# Patient Record
Sex: FEMALE | Race: WHITE | Employment: UNEMPLOYED | URBAN - METROPOLITAN AREA
[De-identification: names, ages, dates, MRNs, and addresses within clinical notes are randomized per-mention and may not be internally consistent; named-entity substitution may affect disease eponyms.]

---
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
## 2021-04-06 ENCOUNTER — OFFICE VISIT (OUTPATIENT)
Dept: FAMILY MEDICINE | Facility: CLINIC | Age: 21
End: 2021-04-06
Payer: COMMERCIAL

## 2021-04-06 VITALS
SYSTOLIC BLOOD PRESSURE: 116 MMHG | RESPIRATION RATE: 16 BRPM | WEIGHT: 108.3 LBS | HEIGHT: 64 IN | HEART RATE: 75 BPM | OXYGEN SATURATION: 100 % | TEMPERATURE: 98.1 F | BODY MASS INDEX: 18.49 KG/M2 | DIASTOLIC BLOOD PRESSURE: 72 MMHG

## 2021-04-06 DIAGNOSIS — N92.6 MISSED PERIOD: ICD-10-CM

## 2021-04-06 DIAGNOSIS — Z11.3 ROUTINE SCREENING FOR STI (SEXUALLY TRANSMITTED INFECTION): Primary | ICD-10-CM

## 2021-04-06 DIAGNOSIS — N39.44 BED WETTING: ICD-10-CM

## 2021-04-06 LAB
ALBUMIN UR-MCNC: NEGATIVE MG/DL
AMORPH CRY #/AREA URNS HPF: ABNORMAL /HPF
APPEARANCE UR: ABNORMAL
BACTERIA #/AREA URNS HPF: ABNORMAL /HPF
BILIRUB UR QL STRIP: NEGATIVE
COLOR UR AUTO: YELLOW
GLUCOSE UR STRIP-MCNC: NEGATIVE MG/DL
HCG UR QL: NEGATIVE
HGB UR QL STRIP: NEGATIVE
KETONES UR STRIP-MCNC: NEGATIVE MG/DL
LEUKOCYTE ESTERASE UR QL STRIP: ABNORMAL
NITRATE UR QL: NEGATIVE
NON-SQ EPI CELLS #/AREA URNS LPF: ABNORMAL /LPF
PH UR STRIP: 8 PH (ref 5–7)
RBC #/AREA URNS AUTO: ABNORMAL /HPF
SOURCE: ABNORMAL
SP GR UR STRIP: 1.02 (ref 1–1.03)
SPECIMEN SOURCE: NORMAL
UROBILINOGEN UR STRIP-ACNC: 0.2 EU/DL (ref 0.2–1)
WBC #/AREA URNS AUTO: ABNORMAL /HPF
WET PREP SPEC: NORMAL

## 2021-04-06 PROCEDURE — 87210 SMEAR WET MOUNT SALINE/INK: CPT | Performed by: PHYSICIAN ASSISTANT

## 2021-04-06 PROCEDURE — 87591 N.GONORRHOEAE DNA AMP PROB: CPT | Performed by: PHYSICIAN ASSISTANT

## 2021-04-06 PROCEDURE — 81025 URINE PREGNANCY TEST: CPT | Performed by: PHYSICIAN ASSISTANT

## 2021-04-06 PROCEDURE — 99213 OFFICE O/P EST LOW 20 MIN: CPT | Performed by: PHYSICIAN ASSISTANT

## 2021-04-06 PROCEDURE — 81001 URINALYSIS AUTO W/SCOPE: CPT | Performed by: PHYSICIAN ASSISTANT

## 2021-04-06 PROCEDURE — 87491 CHLMYD TRACH DNA AMP PROBE: CPT | Performed by: PHYSICIAN ASSISTANT

## 2021-04-06 ASSESSMENT — MIFFLIN-ST. JEOR: SCORE: 1251.25

## 2021-04-07 DIAGNOSIS — A74.9 CHLAMYDIA: Primary | ICD-10-CM

## 2021-04-07 LAB
C TRACH DNA SPEC QL NAA+PROBE: POSITIVE
N GONORRHOEA DNA SPEC QL NAA+PROBE: NEGATIVE
SPECIMEN SOURCE: ABNORMAL
SPECIMEN SOURCE: NORMAL

## 2021-04-07 RX ORDER — AZITHROMYCIN 500 MG/1
1000 TABLET, FILM COATED ORAL ONCE
Qty: 2 TABLET | Refills: 0 | Status: SHIPPED | OUTPATIENT
Start: 2021-04-07 | End: 2021-04-07

## 2021-04-07 NOTE — RESULT ENCOUNTER NOTE
Patient informed of provider message. All questions answered. [facility] disease report faxed.    Barbara Yoon RN

## 2021-07-23 DIAGNOSIS — Z30.011 ENCOUNTER FOR BCP (BIRTH CONTROL PILLS) INITIAL PRESCRIPTION: ICD-10-CM

## 2021-07-26 NOTE — TELEPHONE ENCOUNTER
Routing to TC team to assist in scheduling.     LMTCB- needs to be scheduled for PHYSICAL     4/6/21- STI (MK)  4/7/20 VIRTUAL - irregular bleeding (FLIP)  3/17/20 VIRTUAL- acute ill  6/13/2019- IN PERSON- Birth Control (DM)    Tanika BARRETT RN

## 2021-08-02 RX ORDER — NORETHINDRONE ACETATE AND ETHINYL ESTRADIOL .02; 1 MG/1; MG/1
TABLET ORAL
Qty: 63 TABLET | Refills: 0 | Status: SHIPPED | OUTPATIENT
Start: 2021-08-02 | End: 2021-08-10

## 2021-08-02 NOTE — TELEPHONE ENCOUNTER
Patient has an upcoming appointment. Will give refill so she does not have to go without.    Estela Elias RN

## 2021-08-10 ENCOUNTER — OFFICE VISIT (OUTPATIENT)
Dept: FAMILY MEDICINE | Facility: CLINIC | Age: 21
End: 2021-08-10
Payer: COMMERCIAL

## 2021-08-10 VITALS
WEIGHT: 106.4 LBS | RESPIRATION RATE: 16 BRPM | HEIGHT: 64 IN | DIASTOLIC BLOOD PRESSURE: 60 MMHG | SYSTOLIC BLOOD PRESSURE: 108 MMHG | BODY MASS INDEX: 18.16 KG/M2 | OXYGEN SATURATION: 98 % | HEART RATE: 90 BPM | TEMPERATURE: 98.8 F

## 2021-08-10 DIAGNOSIS — A74.9 POSITIVE CHLAMYIDA TEST: ICD-10-CM

## 2021-08-10 DIAGNOSIS — Z86.19 HISTORY OF CHLAMYDIA INFECTION: ICD-10-CM

## 2021-08-10 DIAGNOSIS — Z30.011 ENCOUNTER FOR BCP (BIRTH CONTROL PILLS) INITIAL PRESCRIPTION: ICD-10-CM

## 2021-08-10 DIAGNOSIS — Z00.00 ROUTINE GENERAL MEDICAL EXAMINATION AT A HEALTH CARE FACILITY: Primary | ICD-10-CM

## 2021-08-10 PROCEDURE — 99395 PREV VISIT EST AGE 18-39: CPT | Performed by: NURSE PRACTITIONER

## 2021-08-10 PROCEDURE — 87491 CHLMYD TRACH DNA AMP PROBE: CPT | Performed by: NURSE PRACTITIONER

## 2021-08-10 PROCEDURE — 87591 N.GONORRHOEAE DNA AMP PROB: CPT | Performed by: NURSE PRACTITIONER

## 2021-08-10 RX ORDER — NORETHINDRONE ACETATE AND ETHINYL ESTRADIOL .02; 1 MG/1; MG/1
1 TABLET ORAL DAILY
Qty: 84 TABLET | Refills: 3 | Status: SHIPPED | OUTPATIENT
Start: 2021-08-10 | End: 2022-05-31

## 2021-08-10 ASSESSMENT — ENCOUNTER SYMPTOMS
HEMATURIA: 0
JOINT SWELLING: 0
ARTHRALGIAS: 0
HEMATOCHEZIA: 0
SHORTNESS OF BREATH: 0
PALPITATIONS: 0
PARESTHESIAS: 0
NAUSEA: 0
WEAKNESS: 0
SORE THROAT: 0
EYE PAIN: 0
COUGH: 0
NERVOUS/ANXIOUS: 0
MYALGIAS: 0
HEARTBURN: 0
FREQUENCY: 0
FEVER: 0
CHILLS: 0
DIARRHEA: 0
CONSTIPATION: 0
DIZZINESS: 0
HEADACHES: 0
ABDOMINAL PAIN: 0
DYSURIA: 0

## 2021-08-10 ASSESSMENT — MIFFLIN-ST. JEOR: SCORE: 1242.63

## 2021-08-10 NOTE — PROGRESS NOTES
SUBJECTIVE:   CC: Tomeka RENDON Head is an 20 year old woman who presents for preventive health visit.       Patient has been advised of split billing requirements and indicates understanding: Yes  Healthy Habits:     Getting at least 3 servings of Calcium per day:  Yes    Bi-annual eye exam:  NO    Dental care twice a year:  NO    Sleep apnea or symptoms of sleep apnea:  None    Diet:  Regular (no restrictions)    Frequency of exercise:  2-3 days/week    Duration of exercise:  15-30 minutes    Taking medications regularly:  Yes    Medication side effects:  None    PHQ-2 Total Score: 0    Additional concerns today:  No    History of chlamydia 4/2021, needs follow-up test of cure screening.   No new partners or concerns for symptoms.     Today's PHQ-2 Score:   PHQ-2 ( 1999 Pfizer) 8/10/2021   Q1: Little interest or pleasure in doing things 0   Q2: Feeling down, depressed or hopeless 0   PHQ-2 Score 0   Q1: Little interest or pleasure in doing things Not at all   Q2: Feeling down, depressed or hopeless Not at all   PHQ-2 Score 0       Abuse: Current or Past (Physical, Sexual or Emotional) - No  Do you feel safe in your environment? Yes    Have you ever done Advance Care Planning? (For example, a Health Directive, POLST, or a discussion with a medical provider or your loved ones about your wishes): No, advance care planning information given to patient to review.  Patient declined advance care planning discussion at this time.    Social History     Tobacco Use     Smoking status: Never Smoker     Smokeless tobacco: Never Used     Tobacco comment: Non smoking home   Substance Use Topics     Alcohol use: No     Alcohol/week: 0.0 standard drinks     If you drink alcohol do you typically have >3 drinks per day or >7 drinks per week? No    Alcohol Use 8/10/2021   Prescreen: >3 drinks/day or >7 drinks/week? No   Prescreen: >3 drinks/day or >7 drinks/week? -   No flowsheet data found.    Reviewed orders with patient.  Reviewed  "health maintenance and updated orders accordingly - Yes  Lab work is in process  Labs reviewed in EPIC    Breast Cancer Screening:    History of abnormal Pap smear: NO - under age 21, PAP not appropriate for age     Reviewed and updated as needed this visit by clinical staff  Tobacco  Allergies  Meds  Problems  Med Hx  Surg Hx  Fam Hx  Soc Hx          Reviewed and updated as needed this visit by Provider  Tobacco  Allergies  Meds  Problems  Med Hx  Surg Hx  Fam Hx         Past Medical History:   Diagnosis Date     NO ACTIVE PROBLEMS       Past Surgical History:   Procedure Laterality Date     NO HISTORY OF SURGERY         Review of Systems   Constitutional: Negative for chills and fever.   HENT: Negative for congestion, ear pain, hearing loss and sore throat.    Eyes: Negative for pain and visual disturbance.   Respiratory: Negative for cough and shortness of breath.    Cardiovascular: Negative for chest pain, palpitations and peripheral edema.   Gastrointestinal: Negative for abdominal pain, constipation, diarrhea, heartburn, hematochezia and nausea.   Genitourinary: Negative for dysuria, frequency, genital sores, hematuria and urgency.   Musculoskeletal: Negative for arthralgias, joint swelling and myalgias.   Skin: Negative for rash.   Neurological: Negative for dizziness, weakness, headaches and paresthesias.   Psychiatric/Behavioral: Negative for mood changes. The patient is not nervous/anxious.           OBJECTIVE:   /60 (BP Location: Right arm, Patient Position: Chair, Cuff Size: Adult Regular)   Pulse 90   Temp 98.8  F (37.1  C) (Oral)   Resp 16   Ht 1.626 m (5' 4\")   Wt 48.3 kg (106 lb 6.4 oz)   SpO2 98%   BMI 18.26 kg/m    Physical Exam  GENERAL: healthy, alert and no distress  EYES: Eyes grossly normal to inspection, PERRL and conjunctivae and sclerae normal  HENT: ear canals and TM's normal, nose and mouth without ulcers or lesions  NECK: no adenopathy, no asymmetry, masses, or " "scars and thyroid normal to palpation  RESP: lungs clear to auscultation - no rales, rhonchi or wheezes  CV: regular rate and rhythm, normal S1 S2, no S3 or S4, no murmur, click or rub, no peripheral edema and peripheral pulses strong  ABDOMEN: soft, nontender, no hepatosplenomegaly, no masses and bowel sounds normal  MS: no gross musculoskeletal defects noted, no edema  SKIN: no suspicious lesions or rashes  NEURO: Normal strength and tone, mentation intact and speech normal  PSYCH: mentation appears normal, affect normal/bright    Diagnostic Test Results:  Labs reviewed in Epic    ASSESSMENT/PLAN:   Tomeka was seen today for physical.    Diagnoses and all orders for this visit:    Routine general medical examination at a health care facility  Well adult, annual follow-up.     Encounter for BCP (birth control pills) initial prescription  -     norethindrone-ethinyl estradiol (MICROGESTIN 1/20) 1-20 MG-MCG tablet; Take 1 tablet by mouth daily  Refilled.     History of chlamydia infection  -     Chlamydia & Gonorrhea by PCR, [facility] - Clinic Collect  Test of cure screen pending.     Other orders  -     REVIEW OF HEALTH MAINTENANCE PROTOCOL ORDERS        Patient has been advised of split billing requirements and indicates understanding: Yes  COUNSELING:  Reviewed preventive health counseling, as reflected in patient instructions    Estimated body mass index is 18.26 kg/m  as calculated from the following:    Height as of this encounter: 1.626 m (5' 4\").    Weight as of this encounter: 48.3 kg (106 lb 6.4 oz).        She reports that she has never smoked. She has never used smokeless tobacco.      Counseling Resources:  ATP IV Guidelines  Pooled Cohorts Equation Calculator  Breast Cancer Risk Calculator  BRCA-Related Cancer Risk Assessment: FHS-7 Tool  FRAX Risk Assessment  ICSI Preventive Guidelines  Dietary Guidelines for Americans, 2010  USDA's MyPlate  ASA Prophylaxis  Lung CA Screening    Tomeka Ahmadi, TAMMY " INGRID  [facility]

## 2021-08-11 ENCOUNTER — TELEPHONE (OUTPATIENT)
Dept: FAMILY MEDICINE | Facility: CLINIC | Age: 21
End: 2021-08-11

## 2021-08-11 DIAGNOSIS — A74.9 POSITIVE CHLAMYIDA TEST: ICD-10-CM

## 2021-08-11 LAB
C TRACH DNA SPEC QL PROBE+SIG AMP: POSITIVE
N GONORRHOEA DNA SPEC QL NAA+PROBE: NEGATIVE

## 2021-08-11 RX ORDER — DOXYCYCLINE 100 MG/1
100 CAPSULE ORAL 2 TIMES DAILY
Qty: 14 CAPSULE | Refills: 0 | Status: SHIPPED | OUTPATIENT
Start: 2021-08-11 | End: 2021-08-12

## 2021-08-11 NOTE — TELEPHONE ENCOUNTER
Message left for patient to return call to clinic triage     Positive chalmydia (patient notified via my chart by provider)  - [facility] reporting completed/faxed and placed in log book     rx for doxycycline sent to mail order by Tomeka Ahmadi CNP   - need to find out which local pharmacy patient would like to use then RN can call in or send to Benjie DÍAZ who is in clinic today until 6pm   Discuss safety precautions for STD's     Sharon Mays, Registered Nurse, PAL (Patient Advocate Liason)   [facility]   [phone]

## 2021-08-11 NOTE — TELEPHONE ENCOUNTER
2nd message left for patient to return call to clinic triage     Sharon Mays, Registered Nurse, PAL (Patient Advocate Liason)   [facility]

## 2021-08-12 RX ORDER — DOXYCYCLINE 100 MG/1
100 CAPSULE ORAL 2 TIMES DAILY
Qty: 14 CAPSULE | Refills: 0 | Status: SHIPPED | OUTPATIENT
Start: 2021-08-12 | End: 2022-05-31

## 2021-08-12 NOTE — TELEPHONE ENCOUNTER
Prescription sent to local pharmacy as requested.   Thank you  TAMMY Yang CNP on 8/12/2021 at 9:51 AM

## 2021-08-12 NOTE — TELEPHONE ENCOUNTER
Patient notified, MDVANESA notified     Sharon Mays, Registered Nurse  [facility]

## 2021-08-12 NOTE — TELEPHONE ENCOUNTER
Tomeka Ahmadi CNP   Please resend antibiotic to local pharmacy, was originally sent to mail order - RN pended     Patient notified of positive result - she was unable to get into my chart     Advised no intercourse until treatment completed   Advise partners of positive result   And use condoms when returns to sexual activity     Sharon Mays, Registered Nurse   [facility]

## 2021-09-18 ENCOUNTER — HEALTH MAINTENANCE LETTER (OUTPATIENT)
Age: 21
End: 2021-09-18

## 2022-02-07 ENCOUNTER — TELEPHONE (OUTPATIENT)
Dept: FAMILY MEDICINE | Facility: CLINIC | Age: 22
End: 2022-02-07
Payer: COMMERCIAL

## 2022-02-07 ENCOUNTER — VIRTUAL VISIT (OUTPATIENT)
Dept: FAMILY MEDICINE | Facility: CLINIC | Age: 22
End: 2022-02-07
Payer: COMMERCIAL

## 2022-02-07 DIAGNOSIS — Z72.51 UNPROTECTED SEXUAL INTERCOURSE: Primary | ICD-10-CM

## 2022-02-07 PROCEDURE — 99213 OFFICE O/P EST LOW 20 MIN: CPT | Mod: 95 | Performed by: PHYSICIAN ASSISTANT

## 2022-02-07 RX ORDER — LEVONORGESTREL 1.5 MG/1
1.5 TABLET ORAL ONCE
Qty: 1 TABLET | Refills: 0 | Status: SHIPPED | OUTPATIENT
Start: 2022-02-07 | End: 2022-05-31

## 2022-02-07 NOTE — TELEPHONE ENCOUNTER
Recommend virtual visit to discuss. I have a few openings later today. Please call patient to schedule.    Keena Davies PA-C

## 2022-02-07 NOTE — TELEPHONE ENCOUNTER
Patient calling stating she had unprotected intercourse Saturday 2/5/2022.  She had been on the Birth Control regularly up until recently.  LMP 1/22/2022.  She had been off the birth control for about 3 weeks prior to her period.  She took 1-2 pills before she got her period and then 4 pills after her period ended.  She was planning on taking Plan B but ended up taking an ovulation test yesterday and it came back Positive.      She states that she started bleeding later in the night after intercourse.  Then the next day (Sunday) she bled heavy with cramping and now today (Monday) she is not bleeding as much.    She is wondering what she should take or what can she take to PREVENT a pregnancy.  Please review and advise further.    Patient going back to school today-[place] .    [phone]    Estela Elias RN

## 2022-02-07 NOTE — PATIENT INSTRUCTIONS
Patient Education     Emergency Contraception (EC)  Emergency contraception (EC) is used to prevent a pregnancy after a woman has had unprotected sex. EC prevents pregnancy in different ways. It may:    Prevent the egg from leaving the ovary    Stop the sperm from reaching the egg    Keep the fertilized egg from attaching to the womb (uterus)  EC is sometimes called the  morning after pill.  But this name is misleading, because some types of EC can work up to 5 days after having sex, not just the morning after. EC is not an  pill. It does not work if you are already pregnant. If you are overweight or obese, certain types of EC may not work as well for you. Talk with your healthcare provider about your options.  When to use EC  You may want to use EC in any of the following types of cases:    You had sex without birth control (unprotected sex).    You were forced to have sex.    You were using a condom but it broke or came off.    You forgot to take your pill or missed your shot.    You suspect that your ring, patch, diaphragm, cervical cap, sponge, or spermicide was not used correctly.    You use the natural family planning method and had unprotected sex at a time when you are likely to become pregnant. (This is usually week 2 of a 4-week cycle, if your periods are regular.)    You were using the withdrawal method, and he didn t pull out in time.    Your IUD came out.    You think your regular birth control method failed.  Note: EC does not protect you from sexually transmitted diseases (STDs). To protect against STDs when having sex, you must always use a condom. This is true even when you use another method of birth control.  Types of EC  Oral medicine    Levonorgestrel (available over the counter)    Ulipristal acetate (prescription only)    A high dose of certain brands of birth control pills (BCPs) can also be used as EC. Talk with your provider to learn more about this option. BCPs require a  prescription.  Note: EC can cause side effects in some women. These can include nausea, vomiting, tender breasts, and headaches. If needed, you can buy medicine over the counter or prescribed to help prevent nausea and vomiting. Talk with a healthcare provider or a pharmacist to learn more.  Insertion of a copper intrauterine device (IUD)    When an IUD is used as a form of EC, it must be placed into the uterus by a trained healthcare provider within 5 days after having unprotected sex. The IUD can be removed after your next period. Or it can be left in place for ongoing birth control. Talk with your provider to learn more.    How well EC works  When used correctly, EC works very well to prevent pregnancy. It is most effective when taken as soon as possible after unprotected sex or suspected failure of birth control. Ideally, EC should be taken within 72 hours. But it can be used up to 120 hours after sex. Ask your provider or pharmacist for details on exactly how well different types of EC work.  Home care    Take EC exactly as directed.    If you don't get your period in 3 weeks, use a home pregnancy test or see a healthcare provider to find out if you are pregnant.    If you have sex before your next period starts, be sure to use another birth control method.    If you are not using birth control regularly, see a healthcare to learn more about your options. You may also go to a local family planning clinic.    Don't rely on EC as a form of regular birth control.    Follow-up care  Follow up with your healthcare provider, if needed.  When to seek medical advice  Call your healthcare provider right away if any of these occur:    You throw up (vomit) within 3 hours of taking EC.    You have severe side effects from taking EC.    You have fever of 100.4 F (38 C) or higher, or as directed by your healthcare provider.    You have irregular vaginal bleeding.    You have heavy vaginal bleeding. This means soaking 1 pad  an hour for 3 hours.    You feel weak, dizzy, or faint when standing.  Resources  To learn more about EC, visit:  Office on Women s Health, [place] Department of Health and Human Services, www.womenshealth.gov, 292.116.2957  [employer] last reviewed this educational content on 6/1/2018 2000-2021 [employer]. All rights reserved. This information is not intended as a substitute for professional medical care. Always follow your healthcare professional's instructions.

## 2022-02-07 NOTE — PROGRESS NOTES
Tomeka is a 21 year old who is being evaluated via a billable video visit.      How would you like to obtain your AVS? MyChart  If the video visit is dropped, the invitation should be resent by: Text to cell phone: [phone]  Will anyone else be joining your video visit? No    Video Start Time: 5:26 PM     Assessment & Plan       Unprotected sexual intercourse  Discussed options. She is ovulating but unknown if she has had her LH surge. Longer acting birth control might be a good option for her and we discussed that it can also be used as emergency contraception. She will take plan B and [facility]. She denies concern for STI and does not want testing at this time. Advised she use condoms every time.  - levonorgestrel (PLAN B) 1.5 MG tablet; Take 1 tablet (1.5 mg) by mouth once for 1 dose    Return in about 6 months (around 8/7/2022) for Preventive Physical Exam.    Keena Davies PA-C  [facility]    Teddy Eckert is a 21 year old who presents for the following health issues     HPI   Wanted to know if there are any options termination of pregnancy if needed.    Patient calling stating she had unprotected intercourse Saturday 2/5/2022.  She had been on the Birth Control regularly up until recently.  LMP 1/22/2022.  She had been off the birth control for about 3 weeks prior to her period.  She took 1-2 pills before she got her period and then 4 pills after her period ended.  She was planning on taking Plan B but ended up taking an ovulation test yesterday and it came back positive.      She states that she started bleeding later in the night after intercourse.  Then the next day (Sunday) she bled with mild cramping and now today (Monday) she is not bleeding as much. Mild cramping but improving.     She is wondering what she should take or what can she take to PREVENT a pregnancy.    She denies concern for  STI.      Review of Systems   Constitutional, HEENT, cardiovascular, pulmonary, gi and gu systems are negative, except as otherwise noted.      Objective    Vitals - Patient Reported  Pain Score: No Pain (0)      Vitals:  No vitals were obtained today due to virtual visit.    Physical Exam   GENERAL: Healthy, alert and no distress  EYES: Eyes grossly normal to inspection.  No discharge or erythema, or obvious scleral/conjunctival abnormalities.  RESP: No audible wheeze, cough, or visible cyanosis.  No visible retractions or increased work of breathing.    SKIN: Visible skin clear. No significant rash, abnormal pigmentation or lesions.  NEURO: Cranial nerves grossly intact.  Mentation and speech appropriate for age.  PSYCH: Mentation appears normal, affect normal/bright, judgement and insight intact, normal speech and appearance well-groomed.      Video-Visit Details    Type of service:  Video Visit    Video End Time: 5:36 PM    Originating Location (pt. Location): Home    [facility]     Platform used for Video Visit: [employer]

## 2022-05-24 ENCOUNTER — NURSE TRIAGE (OUTPATIENT)
Dept: NURSING | Facility: CLINIC | Age: 22
End: 2022-05-24
Payer: COMMERCIAL

## 2022-05-24 NOTE — TELEPHONE ENCOUNTER
Past few months having period 2 xd month.  Last 4 months has been happening.    Taking BCP.    Needs to be seen in next 2 weeks.Warm transferred to scheduling      Sulema Brandt RN  [facility] Nurse Advisor      Reason for Disposition    Menstrual cycle < 21 days OR > 35 days, and occurs more than two cycles (2 months) this past year    Additional Information    Negative: SEVERE vaginal bleeding (e.g., continuous red blood from vagina, or large blood clots) and very weak (can't stand)    Negative: Passed out (i.e., fainted, collapsed and was not responding)    Negative: Difficult to awaken or acting confused (e.g., disoriented, slurred speech)    Negative: Shock suspected (e.g., cold/pale/clammy skin, too weak to stand, low BP, rapid pulse)    Negative: Sounds like a life-threatening emergency to the triager    Negative: Pregnant > 20 weeks (5 months or more)    Negative: Pregnant < 20 weeks (less than 5 months)    Negative: Postpartum (from 0 to 6 weeks after delivery)    Negative: Vaginal discharge is the main symptom and bleeding is slight    Negative: SEVERE abdominal pain (e.g., excruciating)    Negative: SEVERE dizziness (e.g., unable to stand, requires support to walk, feels like passing out now)    Negative: SEVERE vaginal bleeding (e.g., soaking 2 pads or tampons per hour and present 2 or more hours; 1 menstrual cup every 2 hours)    Negative: MODERATE vaginal bleeding (e.g., soaking pad or tampon per hour and present > 6 hours; 1 menstrual cup every 6 hours)    Negative: Pale skin (pallor) of new onset or worsening    Negative: Constant abdominal pain lasting > 2 hours    Negative: Patient sounds very sick or weak to the triager    Negative: Taking Coumadin (warfarin) or other strong blood thinner, or known bleeding disorder (e.g., thrombocytopenia)    Negative: Skin bruises or nosebleed and not caused by an injury    Negative: Bleeding/spotting after procedure (e.g., biopsy) or pelvic  examination (e.g., pap smear) that persists > 3 days    Negative: Patient wants to be seen    Negative: Passed tissue (e.g., gray-white)    Negative: Periods last > 7 days    Negative: Periods with > 6 soaked pads or tampons per day    Negative: Uses menstrual cups and more than 80 ml blood per menstrual period    Negative: Missed period has occurred 2 or more times in the last year and the cause is not known    Protocols used: VAGINAL BLEEDING - [id number]

## 2022-05-31 ENCOUNTER — OFFICE VISIT (OUTPATIENT)
Dept: FAMILY MEDICINE | Facility: CLINIC | Age: 22
End: 2022-05-31
Payer: COMMERCIAL

## 2022-05-31 VITALS
HEIGHT: 64 IN | WEIGHT: 104 LBS | DIASTOLIC BLOOD PRESSURE: 66 MMHG | HEART RATE: 100 BPM | BODY MASS INDEX: 17.75 KG/M2 | OXYGEN SATURATION: 100 % | RESPIRATION RATE: 20 BRPM | SYSTOLIC BLOOD PRESSURE: 100 MMHG | TEMPERATURE: 97.2 F

## 2022-05-31 DIAGNOSIS — Z11.3 ROUTINE SCREENING FOR STI (SEXUALLY TRANSMITTED INFECTION): ICD-10-CM

## 2022-05-31 DIAGNOSIS — N89.8 VAGINAL ODOR: ICD-10-CM

## 2022-05-31 DIAGNOSIS — N92.6 IRREGULAR MENSES: Primary | ICD-10-CM

## 2022-05-31 DIAGNOSIS — B96.89 BACTERIAL VAGINOSIS: ICD-10-CM

## 2022-05-31 DIAGNOSIS — N76.0 BACTERIAL VAGINOSIS: ICD-10-CM

## 2022-05-31 LAB
CLUE CELLS: PRESENT
TRICHOMONAS, WET PREP: ABNORMAL
WBC'S/HIGH POWER FIELD, WET PREP: ABNORMAL
YEAST, WET PREP: ABNORMAL

## 2022-05-31 PROCEDURE — 87491 CHLMYD TRACH DNA AMP PROBE: CPT | Performed by: NURSE PRACTITIONER

## 2022-05-31 PROCEDURE — 99213 OFFICE O/P EST LOW 20 MIN: CPT | Performed by: NURSE PRACTITIONER

## 2022-05-31 PROCEDURE — 87591 N.GONORRHOEAE DNA AMP PROB: CPT | Performed by: NURSE PRACTITIONER

## 2022-05-31 PROCEDURE — 87210 SMEAR WET MOUNT SALINE/INK: CPT | Performed by: NURSE PRACTITIONER

## 2022-05-31 RX ORDER — NORGESTIMATE AND ETHINYL ESTRADIOL 0.25-0.035
1 KIT ORAL DAILY
Qty: 90 TABLET | Refills: 3 | Status: SHIPPED | OUTPATIENT
Start: 2022-05-31 | End: 2022-08-08

## 2022-05-31 RX ORDER — METRONIDAZOLE 7.5 MG/G
1 GEL VAGINAL DAILY
Qty: 25 G | Refills: 0 | Status: SHIPPED | OUTPATIENT
Start: 2022-05-31 | End: 2022-06-05

## 2022-05-31 NOTE — PROGRESS NOTES
Assessment & Plan     Tomeka was seen today for vaginal bleeding.    Diagnoses and all orders for this visit:    Irregular menses  2 cycles each month for the past 3 months.    Plan trial change in oral contraceptives.    -     norgestimate-ethinyl estradiol (ORTHO-CYCLEN) 0.25-35 MG-MCG tablet; Take 1 tablet by mouth daily    Routine screening for STI (sexually transmitted infection)  -     NEISSERIA GONORRHOEA PCR  -     CHLAMYDIA TRACHOMATIS PCR    Vaginal odor  -     Wet prep - lab collect  Results for orders placed or performed in visit on 05/31/22   Wet prep - lab collect     Status: Abnormal    Specimen: Vagina; Swab   Result Value Ref Range    Trichomonas Absent Absent    Yeast Absent Absent    Clue Cells Present (A) Absent    WBCs/high power field 3+ (A) None   Metronidazole gel x5 days.          Return in about 3 months (around 8/31/2022) for Med check, No improvement or sooner with worsening symptoms.    Meagan Castrejon, TAMMY CNP  [facility]        Tomeka is a 21 year old who presents for the following health issues:      History of Present Illness       Reason for visit:  In need of new birth control    She eats 2-3 servings of fruits and vegetables daily.She consumes 1 sweetened beverage(s) daily.She exercises with enough effort to increase her heart rate 30 to 60 minutes per day.  She exercises with enough effort to increase her heart rate 4 days per week. She is missing 1 dose(s) of medications per week.       Menstrual Concern      Since March has had 2 cycles each month.    Menstrual bleeding is lasting 5 days - needing to wear tampons.      Isn't taking on a consistent basis.  Feels irritable and hummel on the birth control.   Is not currently sexually active.    No chance of pregnancy.      Oral contraceptives over the past 5 years.      Onset/Duration: 4 months- starting in march having 2 periods a month  Description:   Duration of bleeding episodes: 5  "days  Frequency of periods: (1st day of one to 1st day of next):  every middle of the month and the end of month   Describe bleeding/flow:   Clots: no  Number of pads/day: not heavy-        Cramping: mild  Accompanying Signs & Symptoms:  Lightheadedness: no  Temperature intolerance: no  Nosebleeds/Easy bruising: no  Vaginal Discharge: no  Acne: no  Change in body hair: no  History:  No LMP recorded.  Previous normal periods: YES  Contraceptive use: birth control  Sexually active: YES  Any bleeding after intercourse: YES  Abnormal PAP Smears: no  Precipitating or alleviating factors: None  Therapies tried and outcome: None        Review of Systems   Constitutional, HEENT, cardiovascular, pulmonary, gi and gu systems are negative, except as otherwise noted.      Objective    /66   Pulse 100   Temp 97.2  F (36.2  C) (Tympanic)   Resp 20   Ht 1.626 m (5' 4\")   Wt 47.2 kg (104 lb)   SpO2 100%   BMI 17.85 kg/m    Body mass index is 17.85 kg/m .       Physical Exam   GENERAL: healthy, alert and no distress  RESP: lungs clear to auscultation - no rales, rhonchi or wheezes  CV: regular rate and rhythm, normal S1 S2  ABDOMEN: soft, nontender, bowel sounds normal  PSYCH: mentation appears normal, affect normal/bright          "

## 2022-06-01 LAB
C TRACH DNA SPEC QL NAA+PROBE: NEGATIVE
N GONORRHOEA DNA SPEC QL NAA+PROBE: NEGATIVE

## 2022-06-01 NOTE — RESULT ENCOUNTER NOTE
Dear Tomeka,     Gonorrhea and Chlamydia screening tests were negative.      Please send a Bill Me Later message or call [phone]  if you have any questions.      Meagan Castrejon, TAMMY, CNP  [facility]    If you have further questions about the interpretation of your labs, [email or web address] is a good website to check out for further information.

## 2022-08-01 DIAGNOSIS — N92.6 IRREGULAR MENSES: ICD-10-CM

## 2022-08-01 RX ORDER — DOXYCYCLINE 100 MG/1
CAPSULE ORAL
COMMUNITY
Start: 2022-07-12

## 2022-08-01 RX ORDER — TRETINOIN 0.5 MG/G
CREAM TOPICAL
COMMUNITY
Start: 2022-07-12

## 2022-08-01 RX ORDER — AZELAIC ACID 0.15 G/G
GEL TOPICAL
COMMUNITY
Start: 2022-07-12

## 2022-08-01 NOTE — TELEPHONE ENCOUNTER
I have not prescribed either of these medications for her. She should follow-up with the provider who prescribed the medications. It looks like the doxycycline was filled at a pharmacy in [place] - has she moved out of state?     There have been isolated reports of decreased efficacy of oral birth control pills leading to breakthrough bleeding or pregnancy in women taking doxycycline but this has not been shown in studies. Doxycycline has not been proven to affect the pharmacokinetics or efficacy of oral birth control pills.     Keena Davies PA-C

## 2022-08-01 NOTE — TELEPHONE ENCOUNTER
Pt calling stating that she is taking doxycycline hyclate, daily for 3 months for acne - she [facility] a [facility] that this medication interacts with birth control.     Pt stated that the reason why she is on the BC pills is for period regulation as she was getting them 2 times a month and since she has been on this medication she is still having her periods two times a month     Would like to know what she should be doing?       Best #   Telephone Information:   Mobile [phone] ok         Please review and advise     Thank you     Sulema Greenwood RN, BSN  [facility]

## 2022-08-05 NOTE — TELEPHONE ENCOUNTER
patient called back to follow up on refill request.   [facility] sent the patient an alert that she can not have her refills of birth control due to drug interaction with doxycycline     Patient had been taking the birth control along with the doxycyline up until yesterday when she received the alert.      writer explained provider's note below.   See office note from 5/31/22     Should she stop the birth control, it  has not regulated her period? Period  Is still 2 times a month.     Can her recommend follow up appointment be virutal? (8/31 or sooner for worsening symptoms)     soonest she can come in person is early September    Patient still going to school in [place], [place] School currently  lives in [place] with her parents.       Use Back up method of birth control for  now.     Linda NUNN RN   [facility] Triage

## 2022-08-08 RX ORDER — NORGESTIMATE AND ETHINYL ESTRADIOL 0.25-0.035
1 KIT ORAL DAILY
Qty: 90 TABLET | Refills: 3 | Status: SHIPPED | OUTPATIENT
Start: 2022-08-08

## 2022-09-19 ENCOUNTER — TELEPHONE (OUTPATIENT)
Dept: FAMILY MEDICINE | Facility: CLINIC | Age: 22
End: 2022-09-19

## 2022-09-19 DIAGNOSIS — N92.6 IRREGULAR MENSES: Primary | ICD-10-CM

## 2022-09-19 NOTE — TELEPHONE ENCOUNTER
Saw Meagan Castrejon, APRN CNP,  On 5/3122. Was placed Birth control new to help regulate menses as was getting 2 periods per month.    She reports1 week full period, and will start again 2 weeks later. Has been taking birth control medication.  very consistently for past 3 month.     For the past 6 months has gotten 2 periods per month, now is a little bit more inconsistent when it comes but still get 2 per month but timing is more inconsistent and more sporadic currently, but still getting 2 periods in 30 days. Originally menses would come first week of the month and end of the month.    Says called a few months ago about her concerns but was not called back. I do not see a telephone encounter after OV however. Advised to call back if she has not hear back by the end of the week. Listed number in Epic is verified.     Also on acne medication and  She has heard that can interfere with BC Medications. So is wondering now what she should do. Was prescribed about one month after started birth control medications.      Daiana Mcgregor R.N.

## 2022-09-19 NOTE — TELEPHONE ENCOUNTER
Would recommend follow-up virtual visit and discussion regarding possible next steps/possible labs.      If patient would like to go straight to OB/GYN for further consultation - I placed a referral and that is a good option as well.   Give contact info.        Regarding doxycycline medication and ocps:    There have been isolated reports of decreased efficacy of oral birth control pills leading to breakthrough bleeding or pregnancy in women taking doxycycline but this has not been shown in studies. Doxycycline has not been proven to affect the pharmacokinetics or efficacy of oral birth control pills.     -JMeixl, CNP

## 2022-09-20 NOTE — TELEPHONE ENCOUNTER
Attempt # 1    Called #   Telephone Information:   Mobile [phone]     Phone rang multiple times then disconnected     Sulema Greenwood RN, BSN  [facility]

## 2022-09-20 NOTE — TELEPHONE ENCOUNTER
Call back from patient. Advised. States she would prefer to schedule a follow up with Meagan and will call back to schedule.

## 2022-11-20 ENCOUNTER — HEALTH MAINTENANCE LETTER (OUTPATIENT)
Age: 22
End: 2022-11-20

## 2023-11-25 ENCOUNTER — HEALTH MAINTENANCE LETTER (OUTPATIENT)
Age: 23
End: 2023-11-25

## 2024-12-29 ENCOUNTER — HEALTH MAINTENANCE LETTER (OUTPATIENT)
Age: 24
End: 2024-12-29